# Patient Record
Sex: FEMALE | Race: OTHER | HISPANIC OR LATINO | URBAN - METROPOLITAN AREA
[De-identification: names, ages, dates, MRNs, and addresses within clinical notes are randomized per-mention and may not be internally consistent; named-entity substitution may affect disease eponyms.]

---

## 2023-12-19 ENCOUNTER — EMERGENCY (EMERGENCY)
Facility: HOSPITAL | Age: 38
LOS: 1 days | Discharge: HOME | End: 2023-12-19
Admitting: STUDENT IN AN ORGANIZED HEALTH CARE EDUCATION/TRAINING PROGRAM
Payer: MEDICAID

## 2023-12-19 VITALS
HEART RATE: 105 BPM | TEMPERATURE: 99 F | RESPIRATION RATE: 18 BRPM | DIASTOLIC BLOOD PRESSURE: 85 MMHG | OXYGEN SATURATION: 99 % | SYSTOLIC BLOOD PRESSURE: 135 MMHG

## 2023-12-19 DIAGNOSIS — Z98.51 TUBAL LIGATION STATUS: Chronic | ICD-10-CM

## 2023-12-19 DIAGNOSIS — Z98.89 OTHER SPECIFIED POSTPROCEDURAL STATES: Chronic | ICD-10-CM

## 2023-12-19 PROCEDURE — 99284 EMERGENCY DEPT VISIT MOD MDM: CPT

## 2023-12-19 PROCEDURE — 93010 ELECTROCARDIOGRAM REPORT: CPT

## 2023-12-19 RX ORDER — HYDROXYZINE HCL 10 MG
25 TABLET ORAL ONCE
Refills: 0 | Status: COMPLETED | OUTPATIENT
Start: 2023-12-19 | End: 2023-12-19

## 2023-12-19 RX ORDER — EPINEPHRINE 0.3 MG/.3ML
0.3 INJECTION INTRAMUSCULAR; SUBCUTANEOUS
Qty: 2 | Refills: 1
Start: 2023-12-19

## 2023-12-19 RX ADMIN — Medication 25 MILLIGRAM(S): at 20:28

## 2023-12-19 NOTE — ED PROVIDER NOTE - ENMT, MLM
Airway patent, Nasal mucosa clear. Mouth with normal mucosa. Throat has no vesicles, no oropharyngeal exudates and uvula is midline.  no oral edema.

## 2023-12-19 NOTE — ED ADULT NURSE NOTE - NSFALLUNIVINTERV_ED_ALL_ED
Bed/Stretcher in lowest position, wheels locked, appropriate side rails in place/Call bell, personal items and telephone in reach/Instruct patient to call for assistance before getting out of bed/chair/stretcher/Non-slip footwear applied when patient is off stretcher/[place] to call system/Physically safe environment - no spills, clutter or unnecessary equipment/Purposeful proactive rounding/Room/bathroom lighting operational, light cord in reach Bed/Stretcher in lowest position, wheels locked, appropriate side rails in place/Call bell, personal items and telephone in reach/Instruct patient to call for assistance before getting out of bed/chair/stretcher/Non-slip footwear applied when patient is off stretcher/[place] to call system/Physically safe environment - no spills, clutter or unnecessary equipment/Purposeful proactive rounding/Room/bathroom lighting operational, light cord in reach

## 2023-12-19 NOTE — ED PROVIDER NOTE - OBJECTIVE STATEMENT
38-year-old female no reported past medical history presents with diffuse urticaria and itchiness x 1 day.  Patient reports she was seen in urgent care for urticaria and feeling of throat tightness.  Was given IM Benadryl, IM Decadron, IM epi and sent to emergency department for evaluation.  Patient states symptoms have improved.  Still having mild itchiness.  No trouble swallowing or breathing.  No oral swelling.  Patient unsure of allergen.  No new products.  No new medications.  No suspicious food intake.

## 2023-12-19 NOTE — ED PROVIDER NOTE - CLINICAL SUMMARY MEDICAL DECISION MAKING FREE TEXT BOX
38-year-old female no reported past medical history presents with diffuse urticaria and itchiness x 1 day.  Patient reports she was seen in urgent care for urticaria and feeling of throat tightness.  Was given IM Benadryl, IM Decadron, IM epi and sent to emergency department for evaluation.  Patient states symptoms have improved.  Still having mild itchiness.  No trouble swallowing or breathing.  No oral swelling.  Patient unsure of allergen.  No new products.  No new medications.  No suspicious food intake.    plan  - observation   - atarax for pruritus

## 2023-12-19 NOTE — ED ADULT NURSE NOTE - OBJECTIVE STATEMENT
Pt is A&O x 4, ambulatory w/o assistance, shows no signs of acute distress. Presents to  ED w/ generalized pruritis and hives x 1 day. Pt states she began having the symptoms last night, and they have gotten progressively worse. Unsure of why she is having an allergic reaction. Endorses pruritis and throat tightness, which has since resolved. States she took Benadryl while at home, with minimal relief. Pt currently speaking in full sentences. No drooling or stridor noted. Denies fevers/chills, h/a, n/v/d, SOB or chest discomfort. VSS stable upon arrival. Respirations even and unlabored. Atarax 25 mg PO given. Pending reassessment.

## 2023-12-19 NOTE — ED PROVIDER NOTE - PATIENT PORTAL LINK FT
You can access the FollowMyHealth Patient Portal offered by [facility] by registering at the following website: [email or web address]. By joining [employer]’s FollowMyHealth portal, you will also be able to view your health information using other applications (apps) compatible with our system. You can access the FollowMyHealth Patient Portal offered by [facility] by registering at the following website: [email or web address]. By joining [employer]’s FollowMyHealth portal, you will also be able to view your health information using other applications (apps) compatible with our system.

## 2023-12-19 NOTE — ED ADULT TRIAGE NOTE - CHIEF COMPLAINT QUOTE
pt brought in by EMS from urgent care after a possible allergic reaction to chinese food last night. pt denies any sob, drooling or difficulty swallowing,   pt given 25mg IM benadryl 0.3E EPI IM

## 2023-12-19 NOTE — ED PROVIDER NOTE - PROGRESS NOTE DETAILS
Pt feeling better. Itchiness improved. No throat tightness or airway complaints. Well appearing. will d/c with epi pen rx. Referral to allergists. Advised to take benadryl every 8 hours as needed.